# Patient Record
Sex: MALE
[De-identification: names, ages, dates, MRNs, and addresses within clinical notes are randomized per-mention and may not be internally consistent; named-entity substitution may affect disease eponyms.]

---

## 2019-10-24 ENCOUNTER — TRANSCRIPTION ENCOUNTER (OUTPATIENT)
Age: 49
End: 2019-10-24

## 2022-10-12 PROBLEM — Z00.00 ENCOUNTER FOR PREVENTIVE HEALTH EXAMINATION: Status: ACTIVE | Noted: 2022-10-12

## 2023-07-14 ENCOUNTER — APPOINTMENT (OUTPATIENT)
Dept: ORTHOPEDIC SURGERY | Facility: CLINIC | Age: 53
End: 2023-07-14
Payer: COMMERCIAL

## 2023-07-14 DIAGNOSIS — Z78.9 OTHER SPECIFIED HEALTH STATUS: ICD-10-CM

## 2023-07-14 PROCEDURE — 20600 DRAIN/INJ JOINT/BURSA W/O US: CPT | Mod: LT

## 2023-07-14 PROCEDURE — 73140 X-RAY EXAM OF FINGER(S): CPT | Mod: LT

## 2023-07-14 PROCEDURE — 99204 OFFICE O/P NEW MOD 45 MIN: CPT | Mod: 25

## 2023-07-14 NOTE — HISTORY OF PRESENT ILLNESS
[Dull/Aching] : dull/aching [de-identified] : 7/14/23: 54yo RHD male () presents for LEFT thumb pain for years, worse recently. Wearing brace intermittently, but feels more stiff/painful if he wears it for a prolonged period. 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 s/p CSI 4/30/19.\par \par Last seen 5/4/20 for:\par - LEFT thumb CMCJ. s/p CSI 4/30/19.\par - RIGHT ring trigger finger. s/p CSI 5/4/20.\par - LEFT CuTS => recommended activity/posture modification and night splint.\par - RIGHT CTS => night splint.\par \par Hx: none.\par Sx: none. upcoming surgery for urolith.\par NKDA. [FreeTextEntry1] : LT Thumb  [FreeTextEntry5] : Arden is a 53 year M, here for an evaluation of the LT thumb. Stated this is a long time injury. seen  in the past. Pain is present

## 2023-07-14 NOTE — IMAGING
[de-identified] : LEFT HAND\par skin intact. no swelling.\par TTP to thumb CMCJ.\par wrist ROM: good extension, flexion. good pronation, supination.\par good EPL, FPL. good finger extension, flex to full fist. good finger abduction and adduction. \par SILT to median, ulnar, radial distribution. \par palpable radial pulse, brisk cap refill all digits.\par no triggering.\par \par \par XRAYS OF LEFT THUMB: no acute displaced fracture or dislocation. severe djd of thumb CMCJ. Other Specify

## 2023-07-14 NOTE — ASSESSMENT
[FreeTextEntry1] : The condition was explained to the patient.\par Discussed that arthritis is progressive, but has a remitting and relapsing course. Discussed treatment ladder - observation, oral NSAIDs, bracing, steroid injection, and ultimately surgery if necessary. \par \par Patient would like to proceed with CSI.\par - Discussed risks, benefits, and alternatives as well as contents of injection. Risks include, but are not limited allergic reaction, flare reaction, injection site pain, bruising, numbness, increased blood sugar, skin discoloration, fat atrophy, tendon rupture, and infection. Risk of immune suppression and increased susceptibility to infection with steroid use. Patient expressed understanding and would like to proceed with injection.\par - The skin over the LEFT thumb CMCJ was cleansed with alcohol/betadine and anesthetized with ethyl chloride and 1cc of 1% lidocaine. The joint was injected with 6mg of celestone. Site was dressed with gauze and a band-aid. Patient tolerated the procedure well.\par \par F/u PRN.

## 2023-07-14 NOTE — HISTORY OF PRESENT ILLNESS
[Dull/Aching] : dull/aching [de-identified] : 7/14/23: 54yo RHD male () presents for LEFT thumb pain for years, worse recently. Wearing brace intermittently, but feels more stiff/painful if he wears it for a prolonged period. 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 s/p CSI 4/30/19.\par \par Last seen 5/4/20 for:\par - LEFT thumb CMCJ. s/p CSI 4/30/19.\par - RIGHT ring trigger finger. s/p CSI 5/4/20.\par - LEFT CuTS => recommended activity/posture modification and night splint.\par - RIGHT CTS => night splint.\par \par Hx: none.\par Sx: none. upcoming surgery for urolith.\par NKDA. [FreeTextEntry1] : LT Thumb  [FreeTextEntry5] : Arden is a 53 year M, here for an evaluation of the LT thumb. Stated this is a long time injury. seen  in the past. Pain is present

## 2023-07-14 NOTE — IMAGING
[de-identified] : LEFT HAND\par skin intact. no swelling.\par TTP to thumb CMCJ.\par wrist ROM: good extension, flexion. good pronation, supination.\par good EPL, FPL. good finger extension, flex to full fist. good finger abduction and adduction. \par SILT to median, ulnar, radial distribution. \par palpable radial pulse, brisk cap refill all digits.\par no triggering.\par \par \par XRAYS OF LEFT THUMB: no acute displaced fracture or dislocation. severe djd of thumb CMCJ.

## 2024-05-02 ENCOUNTER — APPOINTMENT (OUTPATIENT)
Dept: ORTHOPEDIC SURGERY | Facility: CLINIC | Age: 54
End: 2024-05-02
Payer: COMMERCIAL

## 2024-05-02 DIAGNOSIS — M18.12 UNILATERAL PRIMARY OSTEOARTHRITIS OF FIRST CARPOMETACARPAL JOINT, LEFT HAND: ICD-10-CM

## 2024-05-02 PROCEDURE — 99214 OFFICE O/P EST MOD 30 MIN: CPT | Mod: 25

## 2024-05-02 PROCEDURE — 20600 DRAIN/INJ JOINT/BURSA W/O US: CPT | Mod: LT

## 2024-05-02 NOTE — IMAGING
[de-identified] : LEFT HAND skin intact. no swelling. TTP to thumb CMCJ. wrist ROM: good extension, flexion. good pronation, supination. good EPL, FPL. good finger extension, flex to full fist. good finger abduction and adduction.  SILT to median, ulnar, radial distribution.  palpable radial pulse, brisk cap refill all digits. no triggering.

## 2024-05-02 NOTE — HISTORY OF PRESENT ILLNESS
[de-identified] : 5/2/24: f/u LEFT thumb CMCJ arthritis. s/p CSI on 7/14/23. reports good pain relief with injection, pain returned ~6 weeks ago, worse in the last week.  7/14/23: 52yo RHD male () presents for LEFT thumb pain for years, worse recently. Wearing brace intermittently, but feels more stiff/painful if he wears it for a prolonged period. Notes intermittent numbness around basal thumb joint. Using Aleve PRN pain. 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 s/p CSI 4/30/19.  Last seen 5/4/20 for: - LEFT thumb CMCJ. s/p CSI 4/30/19. - RIGHT ring trigger finger. s/p CSI 5/4/20. - LEFT CuTS => recommended activity/posture modification and night splint. - RIGHT CTS => night splint.  Hx: none. Sx: none. upcoming surgery for urolith. NKDA. [FreeTextEntry1] : LEFT thumb  [FreeTextEntry5] : SHWETHA is here today to follow up on his LEFT thumb. states CSI wore off ~2 weeks ago. would like to repeat today.

## 2024-05-02 NOTE — ASSESSMENT
[FreeTextEntry1] : Patient would like to repeat CSI. - Discussed risks, benefits, and alternatives as well as contents of injection. Risks include, but are not limited allergic reaction, flare reaction, injection site pain, bruising, numbness, increased blood sugar, skin discoloration, fat atrophy, tendon rupture, and infection. Risk of immune suppression and increased susceptibility to infection with steroid use. Patient expressed understanding and would like to proceed with injection. - The skin over the LEFT thumb CMCJ was cleansed with alcohol/betadine and anesthetized with ethyl chloride and 1cc of 1% lidocaine. The joint was injected with 6mg of celestone. Site was dressed with gauze and a band-aid. Patient tolerated the procedure well.  F/u PRN. Repeat X-rays L thumb + CMCJ view at next visit.